# Patient Record
Sex: FEMALE | Race: WHITE | NOT HISPANIC OR LATINO | Employment: FULL TIME | URBAN - METROPOLITAN AREA
[De-identification: names, ages, dates, MRNs, and addresses within clinical notes are randomized per-mention and may not be internally consistent; named-entity substitution may affect disease eponyms.]

---

## 2022-09-19 ENCOUNTER — OFFICE VISIT (OUTPATIENT)
Dept: FAMILY MEDICINE | Facility: CLINIC | Age: 62
End: 2022-09-19

## 2022-09-19 VITALS
BODY MASS INDEX: 23.98 KG/M2 | TEMPERATURE: 97.8 F | WEIGHT: 149.2 LBS | DIASTOLIC BLOOD PRESSURE: 80 MMHG | HEIGHT: 66 IN | OXYGEN SATURATION: 100 % | SYSTOLIC BLOOD PRESSURE: 122 MMHG | HEART RATE: 63 BPM

## 2022-09-19 DIAGNOSIS — Z12.31 ENCOUNTER FOR SCREENING MAMMOGRAM FOR BREAST CANCER: ICD-10-CM

## 2022-09-19 DIAGNOSIS — Z71.89 ACP (ADVANCE CARE PLANNING): ICD-10-CM

## 2022-09-19 DIAGNOSIS — Z12.11 SCREENING FOR MALIGNANT NEOPLASM OF COLON: ICD-10-CM

## 2022-09-19 DIAGNOSIS — Z76.89 HEALTH CARE HOME: ICD-10-CM

## 2022-09-19 DIAGNOSIS — Z00.00 ENCOUNTER FOR ROUTINE HISTORY AND PHYSICAL EXAM IN FEMALE: Primary | ICD-10-CM

## 2022-09-19 DIAGNOSIS — Z11.4 SCREENING FOR HIV (HUMAN IMMUNODEFICIENCY VIRUS): ICD-10-CM

## 2022-09-19 DIAGNOSIS — Z11.59 NEED FOR HEPATITIS C SCREENING TEST: ICD-10-CM

## 2022-09-19 LAB
BUN SERPL-MCNC: 12 MG/DL (ref 7–25)
BUN/CREATININE RATIO: 12.6 (ref 6–22)
CALCIUM SERPL-MCNC: 9.7 MG/DL (ref 8.6–10.3)
CHLORIDE SERPLBLD-SCNC: 100.1 MMOL/L (ref 98–110)
CHOLEST SERPL-MCNC: 245 MG/DL (ref 0–199)
CHOLEST/HDLC SERPL: 4 {RATIO} (ref 0–5)
CO2 SERPL-SCNC: 30.9 MMOL/L (ref 20–32)
CREAT SERPL-MCNC: 0.95 MG/DL (ref 0.6–1.3)
GLUCOSE SERPL-MCNC: 92 MG/DL (ref 60–99)
HDLC SERPL-MCNC: 70 MG/DL (ref 40–150)
LDLC SERPL CALC-MCNC: 142 MG/DL (ref 0–130)
POTASSIUM SERPL-SCNC: 4.68 MMOL/L (ref 3.5–5.3)
SODIUM SERPL-SCNC: 140.5 MMOL/L (ref 135–146)
TRIGL SERPL-MCNC: 167 MG/DL (ref 0–149)

## 2022-09-19 PROCEDURE — 36415 COLL VENOUS BLD VENIPUNCTURE: CPT | Performed by: FAMILY MEDICINE

## 2022-09-19 PROCEDURE — 99396 PREV VISIT EST AGE 40-64: CPT | Mod: 25 | Performed by: FAMILY MEDICINE

## 2022-09-19 PROCEDURE — 80048 BASIC METABOLIC PNL TOTAL CA: CPT | Performed by: FAMILY MEDICINE

## 2022-09-19 PROCEDURE — 80061 LIPID PANEL: CPT | Performed by: FAMILY MEDICINE

## 2022-09-19 PROCEDURE — 90686 IIV4 VACC NO PRSV 0.5 ML IM: CPT | Performed by: FAMILY MEDICINE

## 2022-09-19 PROCEDURE — 90471 IMMUNIZATION ADMIN: CPT | Performed by: FAMILY MEDICINE

## 2022-09-19 NOTE — NURSING NOTE
Chief Complaint   Patient presents with     [facility] Care     Moved here from [facility] a few years ago      Physical     Fasting today, no concerns      Pre-visit Screening:  Immunizations:  not up to date - shingrix at pharmacy  Colonoscopy:  is due and ordered today  Mammogram: is due and ordered today  Asthma Action Test/Plan:  NA  PHQ9:  NA  GAD7:  NA  Questioned patient about current smoking habits Pt. quit smoking some time ago.  Ok to leave detailed message on voice mail for today's visit only Yes, phone # [phone]

## 2022-09-19 NOTE — PROGRESS NOTES
SUBJECTIVE:   CC: David is an 62 year old who presents for preventive health visit.       Patient has been advised of split billing requirements and indicates understanding: Yes  HPI    Here for a physical. No concerns.  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 She is , moved here from [place].        Today's PHQ-2 Score:   PHQ-2 ( 1999 [employer]) 9/19/2022   Q1: Little interest or pleasure in doing things 0   Q2: Feeling down, depressed or hopeless 0   PHQ-2 Score 0         Do you feel safe in your environment? Yes    Have you ever done Advance Care Planning? (For example, a Health Directive, POLST, or a discussion with a medical provider or your loved ones about your wishes): No, advance care planning information given to patient to review.  Patient plans to discuss their wishes with loved ones or provider.      Social History     Tobacco Use     Smoking status: Former Smoker     Smokeless tobacco: Never Used     Tobacco comment: smoked in highschool   Substance Use Topics     Alcohol use: Yes     Alcohol/week: 14.0 standard drinks     Types: 14 Standard drinks or equivalent per week     No concerns    No flowsheet data found.    Reviewed orders with patient.  Reviewed health maintenance and updated orders accordingly - Yes  BP Readings from Last 3 Encounters:   09/19/22 122/80    Wt Readings from Last 3 Encounters:   09/19/22 67.7 kg (149 lb 3.2 oz)                  Patient Active Problem List   Diagnosis     ACP (advance care planning)     Health Care Home     Past Surgical History:   Procedure Laterality Date     [place] EXCISION OF GANGLION CYST FOREARM Left        Social History     Tobacco Use     Smoking status: Former Smoker     Smokeless tobacco: Never Used     Tobacco comment: smoked in highschool   Substance Use Topics     Alcohol use: Yes     Alcohol/week: 14.0 standard drinks     Types: 14 Standard drinks or equivalent per week     No family history on file.      No current outpatient  "medications on file.       Breast Cancer Screening:  Any new diagnosis of family breast, ovarian, or bowel cancer? No    FHS-7: No flowsheet data found.  Mammogram referral done.    Pertinent mammograms are reviewed under the imaging tab.    History of abnormal Pap smear: history normal, she will get records, thinks last one a couple of years ago.  No recent vaginal bleeding.     Reviewed and updated as needed this visit by clinical staff   Tobacco  Allergies  Meds  Problems               Reviewed and updated as needed this visit by Provider                   History reviewed. No pertinent past medical history.   Past Surgical History:   Procedure Laterality Date     [place] EXCISION OF GANGLION CYST FOREARM Left        Review of Systems  CONSTITUTIONAL: NEGATIVE for fever, chills, change in weight  INTEGUMENTARY/SKIN: NEGATIVE for worrisome rashes, moles or lesions  EYES: NEGATIVE for vision changes or irritation  ENT: NEGATIVE for ear, mouth and throat problems  RESP: NEGATIVE for significant cough or SOB  BREAST: NEGATIVE for masses, tenderness or discharge  CV: NEGATIVE for chest pain, palpitations or peripheral edema  GI: NEGATIVE for nausea, abdominal pain, heartburn, or change in bowel habits  : NEGATIVE for unusual urinary or vaginal symptoms. No vaginal bleeding.  MUSCULOSKELETAL: NEGATIVE for significant arthralgias or myalgia  NEURO: NEGATIVE for weakness, dizziness or paresthesias  PSYCHIATRIC: NEGATIVE for changes in mood or affect      OBJECTIVE:   /80 (BP Location: Right arm, Patient Position: Sitting, Cuff Size: Adult Regular)   Pulse 63   Temp 97.8  F (36.6  C) (Temporal)   Ht 1.683 m (5' 6.25\")   Wt 67.7 kg (149 lb 3.2 oz)   SpO2 100%   BMI 23.90 kg/m    Physical Exam  GENERAL: healthy, alert and no distress  EYES: Eyes grossly normal to inspection, PERRL and conjunctivae and sclerae normal  HENT: ear canals and TM's normal, nose and mouth without ulcers or lesions  NECK: no " "adenopathy, no asymmetry, masses, or scars and thyroid normal to palpation  RESP: lungs clear to auscultation - no rales, rhonchi or wheezes  BREAST: normal without masses, tenderness or nipple discharge and no palpable axillary masses or adenopathy  CV: regular rate and rhythm, normal S1 S2, no S3 or S4, no murmur, click or rub, no peripheral edema and peripheral pulses strong  ABDOMEN: soft, nontender, no hepatosplenomegaly, no masses and bowel sounds normal   (female): normal female external genitalia, normal urethral meatus, vaginal mucosa pink, moist, well rugated, and normal cervix/adnexa/uterus without masses or discharge  MS: no gross musculoskeletal defects noted, no edema  SKIN: no suspicious lesions or rashes  NEURO: Normal strength and tone, mentation intact and speech normal  PSYCH: mentation appears normal, affect normal/bright    Diagnostic Test Results:  Labs reviewed in Epic  No results found for any visits on 09/19/22.    ASSESSMENT/PLAN:   1. Encounter for routine history and physical exam in female      2. ACP (advance care planning)      3. Health Care Home      4. Encounter for screening mammogram for breast cancer    - Mammo Screening digital (bilat)  - Radiology Referral  - VENOUS COLLECTION  - Lipid Panel (BFP)  - Basic Metabolic Panel (BFP)    5. Screening for malignant neoplasm of colon    - Colonoscopy Screening  Referral    6. Screening for HIV (human immunodeficiency virus)    - HIV 1/2 Agn Shanell 4th Gen w Reflex ([facility])    7. Need for hepatitis C screening test    - HEPATITIS C ANTIBODY ([facility])    Patient has been advised of split billing requirements and indicates understanding: Yes    COUNSELING:  Reviewed preventive health counseling, as reflected in patient instructions       Regular exercise       Healthy diet/nutrition    Estimated body mass index is 23.9 kg/m  as calculated from the following:    Height as of this encounter: 1.683 m (5' 6.25\").    Weight as of this " encounter: 67.7 kg (149 lb 3.2 oz).        She reports that she has quit smoking. She has never used smokeless tobacco.      Erica Carrillo MD  [facility]   not examined

## 2022-09-19 NOTE — LETTER
[facility]  [address]  [address]  [facility] [address]  [phone]      September 19, 2022      Giulia Mason  [address] [facility] [address]      EMERGENCY CARE PLAN  Presenting Problem Treatment Plan   Questions or concerns during clinic hours I will call the clinic directly:    [facility]  [address] , [address]  [place] [address]  [phone]   Questions or concerns outside clinic hours  I will call the 24 hour line at [phone]   Patient needs to schedule an appointment  I will call the  scheduling line at [phone]   Same day treatment   I will call the clinic first, then  urgent care and/or  express care if needed   Clinic Care Coordinators Rylee Jo RN:  [phone]  [facility] Clinical Support Staff: [phone]    Crisis Services:  Behavioral or Mental Health P (Behavioral Health Providers)   [phone]   Emergency treatment--Immediately CALL 424

## 2022-09-20 LAB
HCV AB - QUEST: NORMAL
HIV 1/2 AGN ABY 4TH GEN WITH REFLEX: NORMAL
SIGNAL TO CUT OFF - QUEST: 0.05

## 2022-10-03 ENCOUNTER — HEALTH MAINTENANCE LETTER (OUTPATIENT)
Age: 62
End: 2022-10-03

## 2022-11-03 ENCOUNTER — OFFICE VISIT (OUTPATIENT)
Dept: FAMILY MEDICINE | Facility: CLINIC | Age: 62
End: 2022-11-03

## 2022-11-03 VITALS
DIASTOLIC BLOOD PRESSURE: 80 MMHG | OXYGEN SATURATION: 100 % | WEIGHT: 151.6 LBS | SYSTOLIC BLOOD PRESSURE: 118 MMHG | HEART RATE: 66 BPM | TEMPERATURE: 97.8 F | BODY MASS INDEX: 24.36 KG/M2 | HEIGHT: 66 IN

## 2022-11-03 DIAGNOSIS — R10.32 ABDOMINAL PAIN, LEFT LOWER QUADRANT: Primary | ICD-10-CM

## 2022-11-03 DIAGNOSIS — R91.8 PULMONARY NODULES: ICD-10-CM

## 2022-11-03 LAB
% GRANULOCYTES: 53.1 %
HCT VFR BLD AUTO: 40.4 % (ref 35–47)
HEMOGLOBIN: 13.4 G/DL (ref 11.7–15.7)
LYMPHOCYTES NFR BLD AUTO: 38.5 %
MCH RBC QN AUTO: 31.3 PG (ref 26–33)
MCHC RBC AUTO-ENTMCNC: 33.2 G/DL (ref 31–36)
MCV RBC AUTO: 94.3 FL (ref 78–100)
MONOCYTES NFR BLD AUTO: 8.4 %
PLATELET COUNT - QUEST: 215 10^9/L (ref 150–375)
RBC # BLD AUTO: 4.28 10*12/L (ref 3.8–5.2)
WBC # BLD AUTO: 7.4 10*9/L (ref 4–11)

## 2022-11-03 PROCEDURE — 85025 COMPLETE CBC W/AUTO DIFF WBC: CPT | Performed by: FAMILY MEDICINE

## 2022-11-03 PROCEDURE — 36415 COLL VENOUS BLD VENIPUNCTURE: CPT | Performed by: FAMILY MEDICINE

## 2022-11-03 PROCEDURE — 99214 OFFICE O/P EST MOD 30 MIN: CPT | Performed by: FAMILY MEDICINE

## 2022-11-03 NOTE — LETTER
[facility]  [address] , [address]  [place]  [address]    2022        Jeet Mason  [address] [address]              Dear Jeet Mason    2022      David Mason  [address] [address]        Dear ,    We are writing to inform you of your test results.    Your ct scan did not show anything concerning. We discussed at the apt to refer to see a general surgeon for an evaluation for possible hernia.  Also there were some very small nodules in the lungs. These will need followup.  Please let me know the answers to the following questions:  Do you have a history of cancer? Immunosuppresion?   If so, we will need to do additional evaluation.   If no to both questions, we should repeat the ct scan in 12 months.    I sent the referral to repeat the ct scan in 12 months.    Please see me to followup on your abdominal pain in clinic    Resulted Orders   CT Abdomen Pelvis w Contrast    Narrative    [address], [address]   [place] [address]   Phone: [phone]   [place]  : 1960 Req Phys: Erica Carrillo MD  Patient name: JEET MASON Clinic: [facility]   Dept No: [id number]  *CT ABDOMEN PELVIS w CONTRAST /   CREAT   Exam Date: 2022 Accession: [id number]  Copy To: WHITE, CHIROPRACTIC  EXAM: CT ABDOMEN PELVIS w CONTRAST  LOCATION: [facility] [place]  DATE/TIME: 2022 11:18 AM  INDICATION: Abdominal pain, left lower quadrant.  COMPARISON: None.  TECHNIQUE: CT scan of the abdomen and pelvis was performed following injection of IV contrast. Multiplanar   reformats were obtained. Dose reduction techniques were used.  CONTRAST: 100 ml Omnipaque 350 The patient's creatinine was obtained on-site using an i-STAT analyzer with   the following result Cr = 1 mg/dL.  FINDINGS:  LOWER CHEST: A 4 mm right lower lobe nodule (series 4, image 2) and  5 mm left lower lobe nodule (image 7).  HEPATOBILIARY: Normal.  PANCREAS: Normal.  SPLEEN: Normal.  ADRENAL GLANDS: Normal.  KIDNEYS/BLADDER: Subcentimeter left renal exophytic cyst requiring no specific follow-up. No significant calculi,   hydronephrosis or suspicious masses in either kidney.  BOWEL: No small bowel or colonic obstruction or inflammatory changes. Normal appendix. No significant colonic   diverticulosis. Moderate amount of formed stool in the colon.  LYMPH NODES: No lymphadenopathy.  VASCULATURE: No abdominal aortic aneurysm.  PELVIC ORGANS: No pelvic or adnexal masses. Trace pelvic free fluid. No fluid collections or extraluminal air.  MUSCULOSKELETAL: No suspicious lesions in the bones.  IMPRESSION:  1. No acute findings in the abdomen and pelvis.  2. Few small pulmonary nodules in the lung bases measuring up to 5 mm. See follow-up guidelines.  Page 1 of 2  JEET MERCER : 1960 Exam: *CT ABDOMEN PELVIS w CONTRAST / CREAT  REFERENCE:  Guidelines for Management of Incidental Pulmonary Nodules Detected on CT Images: From the Fleischner Society   2017.  Guidelines apply to incidental nodules in patients who are 35 years or older.  Guidelines do not apply to lung cancer screening, patients with immunosuppression, or patients with known   primary cancer.  MULTIPLE NODULES  Nodule size <6 mm  Low-risk patients: No follow-up needed.  High-risk patients: Optional follow-up at 12 months.  Dictated By: HAYLEY WINCHESTER M.D.  Password protected electronic signature by: VI   Trans: SI  Date Of Trans: 2022 12:13:00PM  Date report approved and signed by interpreting physician: 2022 12:13:00PM  Page 2 of 2       If you have any questions or concerns, please call the clinic at the number listed above.       Sincerely,      Erica Carrillo MD

## 2022-11-03 NOTE — NURSING NOTE
"Chief Complaint   Patient presents with     Pain     Was seeing chiropractor since October for back pain, this has resolved, during this LLQ abdominal pain began, has a \"burning\" sensation, has a tightness near her groin/ lower abdomen, wondering if this is a hernia, has done heavy lifting recently      Pre-visit Screening:  Immunizations:  not up to date - shingrix at pharmacy  Colonoscopy:  is due and to be scheduled by patient for later completion  Mammogram: is due and to be scheduled by patient for later completion  Asthma Action Test/Plan:  NA  PHQ9:  NA  GAD7:  NA  Questioned patient about current smoking habits Pt. quit smoking some time ago.  Ok to leave detailed message on voice mail for today's visit only Yes, phone # [phone]      "

## 2022-11-03 NOTE — PROGRESS NOTES
"Assessment & Plan   Problem List Items Addressed This Visit    None  Visit Diagnoses     Abdominal pain, left lower quadrant    -  Primary    Relevant Orders    VENOUS COLLECTION (Completed)    HEMOGRAM PLATELET DIFF (BFP) (Completed)    Radiology Referral    CT Abdomen Pelvis w Contrast         1. Abdominal pain, left lower quadrant  abd is non tender but she indicates pain in left lower abd. No obvious hernia. I will first get a ct scan of abd/pelvis to look for diverticulitis. If negative, consider referral to surgeon for possible hernia.  - VENOUS COLLECTION  - HEMOGRAM PLATELET DIFF (BFP)  - Radiology Referral  - CT Abdomen Pelvis w Contrast             FUTURE APPOINTMENTS:       - Follow-up visit per results.    No follow-ups on file.    Erica Carrillo MD  [facility]    Subjective     Nursing Notes:   Allyson Berry CMA  11/3/2022 11:39 AM  Signed  Chief Complaint   Patient presents with     Pain     Was seeing chiropractor since October for back pain, this has resolved, during this LLQ abdominal pain began, has a \"burning\" sensation, has a tightness near her groin/ lower abdomen, wondering if this is a hernia, has done heavy lifting recently      Pre-visit Screening:  Immunizations:  not up to date - shingrix at pharmacy  Colonoscopy:  is due and to be scheduled by patient for later completion  Mammogram: is due and to be scheduled by patient for later completion  Asthma Action Test/Plan:  NA  PHQ9:  NA  GAD7:  NA  Questioned patient about current smoking habits Pt. quit smoking some time ago.  Ok to leave detailed message on voice mail for today's visit only Yes, phone # [phone]           Giulia Mason is a 62 year old female who presents to clinic today for the following health issues   HPI   Has been seeing chiro for her back pain since October 1st. The back pain is better but has left lower abd pain, hurts and burns. About a week after doing some outside work, had overall " "pain but then the back is better. When she mentioned to her chiropractor, they told her to come in here to be seen.  Pain in left lower abd. This started about a month ago. Thinks there is a lump In this area. Worse with lifting anything. Moving certain ways. Can't sleep due to the pain. Does some stretches for the tendon in left groin  No blood or  Black stools. No fevers. No constipation.          Review of Systems   Constitutional, HEENT, cardiovascular, pulmonary, gi and gu systems are negative, except as otherwise noted.      Objective    /80 (BP Location: Right arm, Patient Position: Sitting, Cuff Size: Adult Regular)   Pulse 66   Temp 97.8  F (36.6  C) (Temporal)   Ht 1.683 m (5' 6.25\")   Wt 68.8 kg (151 lb 9.6 oz)   SpO2 100%   BMI 24.28 kg/m    Body mass index is 24.28 kg/m .  Physical Exam   GENERAL: healthy, alert and no distress  RESP: lungs clear to auscultation - no rales, rhonchi or wheezes  CV: regular rate and rhythm, normal S1 S2, no S3 or S4, no murmur, click or rub, no peripheral edema and peripheral pulses strong  ABDOMEN: soft, nontender, no hepatosplenomegaly, no masses and bowel sounds normal  MS: no gross musculoskeletal defects noted, no edema  NEURO: Normal strength and tone, mentation intact and speech normal  PSYCH: mentation appears normal, affect normal/bright    Results for orders placed or performed in visit on 11/03/22   HEMOGRAM PLATELET DIFF (BFP)     Status: None   Result Value Ref Range    WBC 7.4 4.0 - 11 10*9/L    RBC Count 4.28 3.8 - 5.2 10*12/L    Hemoglobin 13.4 11.7 - 15.7 g/dL    Hematocrit 40.4 35.0 - 47.0 %    MCV 94.3 78 - 100 fL    MCH 31.3 26 - 33 pg    MCHC 33.2 31 - 36 g/dL    Platelet Count 215 150 - 375 10^9/L    % Granulocytes 53.1 %    % Lymphocytes 38.5 %    % Monocytes 8.4 %         "

## 2022-11-03 NOTE — LETTER
2022      David Mason  [address] [facility] [address]        Dear ,    We are writing to inform you of your test results.    Your ct scan did not show anything concerning. We discussed at the apt to refer to see a general surgeon for an evaluation for possible hernia.  Also there were some very small nodules in the lungs. These will need followup.  Please let me know the answers to the following questions:  Do you have a history of cancer? Immunosuppresion?   If so, we will need to do additional evaluation.   If no to both questions, we should repeat the ct scan in 12 months.    I sent the referral to repeat the ct scan in 12 months.    Resulted Orders   CT Abdomen Pelvis w Contrast    Narrative    [address], [address]   [place] [address]   Phone: [phone]   [place]  : 1960 Req Phys: Erica Carrillo MD  Patient name: JEET MASON Clinic: [facility]   Dept No: [id number]  *CT ABDOMEN PELVIS w CONTRAST /   CREAT   Exam Date: 2022 Accession: [id number]  Copy To: WHITE, CHIROPRACTIC  EXAM: CT ABDOMEN PELVIS w CONTRAST  LOCATION: [facility] [place]  DATE/TIME: 2022 11:18 AM  INDICATION: Abdominal pain, left lower quadrant.  COMPARISON: None.  TECHNIQUE: CT scan of the abdomen and pelvis was performed following injection of IV contrast. Multiplanar   reformats were obtained. Dose reduction techniques were used.  CONTRAST: 100 ml Omnipaque 350 The patient's creatinine was obtained on-site using an i-STAT analyzer with   the following result Cr = 1 mg/dL.  FINDINGS:  LOWER CHEST: A 4 mm right lower lobe nodule (series 4, image 2) and 5 mm left lower lobe nodule (image 7).  HEPATOBILIARY: Normal.  PANCREAS: Normal.  SPLEEN: Normal.  ADRENAL GLANDS: Normal.  KIDNEYS/BLADDER: Subcentimeter left renal exophytic cyst requiring no specific follow-up. No significant calculi,   hydronephrosis or  suspicious masses in either kidney.  BOWEL: No small bowel or colonic obstruction or inflammatory changes. Normal appendix. No significant colonic   diverticulosis. Moderate amount of formed stool in the colon.  LYMPH NODES: No lymphadenopathy.  VASCULATURE: No abdominal aortic aneurysm.  PELVIC ORGANS: No pelvic or adnexal masses. Trace pelvic free fluid. No fluid collections or extraluminal air.  MUSCULOSKELETAL: No suspicious lesions in the bones.  IMPRESSION:  1. No acute findings in the abdomen and pelvis.  2. Few small pulmonary nodules in the lung bases measuring up to 5 mm. See follow-up guidelines.  Page 1 of 2  JEET MERCER : 1960 Exam: *CT ABDOMEN PELVIS w CONTRAST / CREAT  REFERENCE:  Guidelines for Management of Incidental Pulmonary Nodules Detected on CT Images: From the Fleischner Society   2017.  Guidelines apply to incidental nodules in patients who are 35 years or older.  Guidelines do not apply to lung cancer screening, patients with immunosuppression, or patients with known   primary cancer.  MULTIPLE NODULES  Nodule size <6 mm  Low-risk patients: No follow-up needed.  High-risk patients: Optional follow-up at 12 months.  Dictated By: HAYLEY WINCHESTER M.D.  Password protected electronic signature by: VI   Trans: SI  Date Of Trans: 2022 12:13:00PM  Date report approved and signed by interpreting physician: 2022 12:13:00PM  Page 2 of 2       If you have any questions or concerns, please call the clinic at the number listed above.       Sincerely,      Erica Carrillo MD

## 2023-10-22 ENCOUNTER — HEALTH MAINTENANCE LETTER (OUTPATIENT)
Age: 63
End: 2023-10-22

## 2024-06-17 PROBLEM — Z76.89 HEALTH CARE HOME: Status: RESOLVED | Noted: 2022-09-19 | Resolved: 2024-06-17

## 2024-10-06 ENCOUNTER — HEALTH MAINTENANCE LETTER (OUTPATIENT)
Age: 64
End: 2024-10-06